# Patient Record
Sex: MALE | Race: WHITE | NOT HISPANIC OR LATINO | Employment: OTHER | URBAN - METROPOLITAN AREA
[De-identification: names, ages, dates, MRNs, and addresses within clinical notes are randomized per-mention and may not be internally consistent; named-entity substitution may affect disease eponyms.]

---

## 2018-04-05 LAB
ABSOL LYMPHOCYTES (HISTORICAL): 1.3 K/UL (ref 0.5–4)
ALBUMIN SERPL BCP-MCNC: 3.9 G/DL (ref 3–5.2)
ALP SERPL-CCNC: 87 U/L (ref 43–122)
ALT SERPL W P-5'-P-CCNC: 21 U/L (ref 9–52)
ANION GAP SERPL CALCULATED.3IONS-SCNC: 11 MMOL/L (ref 5–14)
AST SERPL W P-5'-P-CCNC: 14 U/L (ref 17–59)
BASOPHILS # BLD AUTO: 0 % (ref 0–1)
BASOPHILS # BLD AUTO: 0 K/UL (ref 0–0.1)
BILIRUB SERPL-MCNC: 0.4 MG/DL
BUN SERPL-MCNC: 24 MG/DL (ref 5–25)
CALCIUM SERPL-MCNC: 9 MG/DL (ref 8.4–10.2)
CHLORIDE SERPL-SCNC: 102 MEQ/L (ref 97–108)
CO2 SERPL-SCNC: 21 MMOL/L (ref 22–30)
COMMENT (HISTORICAL): ABNORMAL
CREATINE, SERUM (HISTORICAL): 1.39 MG/DL (ref 0.7–1.5)
DEPRECATED RDW RBC AUTO: 18.9 %
EGFR (HISTORICAL): 49 ML/MIN/1.73 M2
EOSINOPHIL # BLD AUTO: 0.3 K/UL (ref 0–0.4)
EOSINOPHIL NFR BLD AUTO: 4 % (ref 0–6)
EST. AVERAGE GLUCOSE BLD GHB EST-MCNC: 169 MG/DL
GLUCOSE SERPL-MCNC: 231 MG/DL (ref 70–99)
HBA1C MFR BLD HPLC: 7.5 %
HCT VFR BLD AUTO: 27.1 % (ref 41–53)
HGB BLD-MCNC: 8.1 G/DL (ref 13.5–17.5)
LYMPHOCYTES NFR BLD AUTO: 16 % (ref 25–45)
MCH RBC QN AUTO: 19.8 PG (ref 26–34)
MCHC RBC AUTO-ENTMCNC: 29.8 % (ref 31–36)
MCV RBC AUTO: 67 FL (ref 80–100)
MONOCYTES # BLD AUTO: 0.6 K/UL (ref 0.2–0.9)
MONOCYTES NFR BLD AUTO: 7 % (ref 1–10)
NEUTROPHILS ABS COUNT (HISTORICAL): 5.7 K/UL (ref 1.8–7.8)
NEUTS SEG NFR BLD AUTO: 73 % (ref 45–65)
PLATELET # BLD AUTO: 292 K/MCL (ref 150–450)
POTASSIUM SERPL-SCNC: 4.9 MEQ/L (ref 3.6–5)
RBC # BLD AUTO: 4.06 M/MCL (ref 4.5–5.9)
RBC MORPHOLOGY (HISTORICAL): ABNORMAL
SODIUM SERPL-SCNC: 135 MEQ/L (ref 137–147)
TOTAL PROTEIN (HISTORICAL): 7 G/DL (ref 5.9–8.4)
WBC # BLD AUTO: 7.9 K/MCL (ref 4.5–11)

## 2018-08-03 ENCOUNTER — OFFICE VISIT (OUTPATIENT)
Dept: FAMILY MEDICINE CLINIC | Facility: CLINIC | Age: 83
End: 2018-08-03
Payer: MEDICARE

## 2018-08-03 VITALS
HEART RATE: 78 BPM | SYSTOLIC BLOOD PRESSURE: 120 MMHG | RESPIRATION RATE: 18 BRPM | TEMPERATURE: 96.1 F | DIASTOLIC BLOOD PRESSURE: 60 MMHG

## 2018-08-03 DIAGNOSIS — R41.89 COGNITIVE AND BEHAVIORAL CHANGES: ICD-10-CM

## 2018-08-03 DIAGNOSIS — J30.1 ALLERGIC RHINITIS DUE TO POLLEN, UNSPECIFIED SEASONALITY: Primary | ICD-10-CM

## 2018-08-03 DIAGNOSIS — R46.89 COGNITIVE AND BEHAVIORAL CHANGES: ICD-10-CM

## 2018-08-03 DIAGNOSIS — I63.9 CEREBROVASCULAR ACCIDENT (CVA), UNSPECIFIED MECHANISM (HCC): ICD-10-CM

## 2018-08-03 DIAGNOSIS — R80.9 MICROALBUMINURIA: ICD-10-CM

## 2018-08-03 DIAGNOSIS — C61 PROSTATE CANCER (HCC): ICD-10-CM

## 2018-08-03 DIAGNOSIS — D51.8 OTHER VITAMIN B12 DEFICIENCY ANEMIAS: ICD-10-CM

## 2018-08-03 PROBLEM — D64.9 ANEMIA: Chronic | Status: ACTIVE | Noted: 2018-08-03

## 2018-08-03 PROCEDURE — 83918 ORGANIC ACIDS TOTAL QUANT: CPT | Performed by: FAMILY MEDICINE

## 2018-08-03 PROCEDURE — 99214 OFFICE O/P EST MOD 30 MIN: CPT | Performed by: FAMILY MEDICINE

## 2018-08-03 PROCEDURE — 36415 COLL VENOUS BLD VENIPUNCTURE: CPT | Performed by: FAMILY MEDICINE

## 2018-08-03 PROCEDURE — 85025 COMPLETE CBC W/AUTO DIFF WBC: CPT | Performed by: FAMILY MEDICINE

## 2018-08-03 PROCEDURE — 82607 VITAMIN B-12: CPT | Performed by: FAMILY MEDICINE

## 2018-08-03 PROCEDURE — 84165 PROTEIN E-PHORESIS SERUM: CPT | Performed by: PATHOLOGY

## 2018-08-03 PROCEDURE — 84165 PROTEIN E-PHORESIS SERUM: CPT | Performed by: FAMILY MEDICINE

## 2018-08-03 PROCEDURE — 82728 ASSAY OF FERRITIN: CPT | Performed by: FAMILY MEDICINE

## 2018-08-03 RX ORDER — DOXYCYCLINE HYCLATE 100 MG/1
CAPSULE ORAL
COMMUNITY
Start: 2018-06-12 | End: 2019-01-08

## 2018-08-03 RX ORDER — TAMSULOSIN HYDROCHLORIDE 0.4 MG/1
CAPSULE ORAL
COMMUNITY
Start: 2018-05-21 | End: 2018-10-11 | Stop reason: SDUPTHER

## 2018-08-03 RX ORDER — INSULIN GLARGINE 100 [IU]/ML
INJECTION, SOLUTION SUBCUTANEOUS
COMMUNITY
Start: 2010-12-02

## 2018-08-03 RX ORDER — PRAVASTATIN SODIUM 40 MG
TABLET ORAL EVERY 24 HOURS
COMMUNITY
Start: 2017-08-28

## 2018-08-03 RX ORDER — LEVETIRACETAM 250 MG/1
TABLET ORAL EVERY 12 HOURS
COMMUNITY
Start: 2018-04-05

## 2018-08-03 RX ORDER — BIMATOPROST 0.3 MG/ML
SOLUTION/ DROPS OPHTHALMIC EVERY 24 HOURS
COMMUNITY
Start: 2012-09-26

## 2018-08-03 RX ORDER — CILOSTAZOL 100 MG/1
TABLET ORAL EVERY 12 HOURS
COMMUNITY
Start: 2018-06-08 | End: 2019-04-18

## 2018-08-03 RX ORDER — OMEPRAZOLE 20 MG/1
CAPSULE, DELAYED RELEASE ORAL
COMMUNITY
Start: 2017-12-08 | End: 2019-04-29 | Stop reason: SDUPTHER

## 2018-08-03 RX ORDER — PROCHLORPERAZINE MALEATE 5 MG/1
TABLET ORAL EVERY 6 HOURS
COMMUNITY
Start: 2018-02-28

## 2018-08-03 NOTE — PROGRESS NOTES
Assessment/Plan:    Gastroesophageal reflux disease  No chngs     Ataxic gait  Getting worse over time: home pt eval and treat        Diagnoses and all orders for this visit:    Allergic rhinitis due to pollen, unspecified seasonality    Cerebrovascular accident (CVA), unspecified mechanism ([facility] )  -     Ferritin  -     CBC and differential  -     Methylmalonic acid, serum  -     Vitamin B12  -     Protein electrophoresis, serum  -     Ambulatory referral to Physical Therapy; Future    Prostate cancer (HCC)  -     Ferritin  -     CBC and differential  -     Methylmalonic acid, serum  -     Vitamin B12  -     Protein electrophoresis, serum    Microalbuminuria    Other orders  -     doxycycline hyclate (VIBRAMYCIN) 100 mg capsule; take 1 capsule BID  -     cilostazol (PLETAL) 100 mg tablet; Every 12 hours  -     tamsulosin (FLOMAX) 0 4 mg; TAKE 1 CAPSULE BY MOUTH EVERY DAY 1/2 HOUR FOLLOWING THE SAME MEAL EACH DAY  -     levETIRAcetam (KEPPRA) 250 mg tablet; Every 12 hours  -     prochlorperazine (COMPAZINE) 5 mg tablet; every 6 (six) hours  -     omeprazole (PriLOSEC) 20 mg delayed release capsule; take 1 capsule (20MG)  by ORAL route  every day before a meal  -     metoprolol tartrate (LOPRESSOR) 25 mg tablet; Every 12 hours  -     pravastatin (PRAVACHOL) 40 mg tablet; every 24 hours  -     Insulin Lispro (HUMALOG) 100 units/mL cartridge for injection; inject by subcutaneous route as per insulin sliding scale protocol  -     insulin glargine (LANTUS) 100 units/mL subcutaneous injection; inject by Subcutaneous route as per insulin protocol  -     bimatoprost (LUMIGAN) 0 03 % ophthalmic drops; every 24 hours  -     cyanocobalamin 100 MCG tablet; take 1 by Oral route once  -     brimonidine (ALPHAGAN P) 0 1 %; every 8 (eight) hours          Subjective:      Patient ID: Jon Harvey is a 80 y o  male  Patient comes for his regular checkup    Since last time he has had a gradually worsening energy and fatigue at home as well as gait imbalance  He had done well with physical therapy in the past and we will reorder same now  Also his chronic anemia somewhat worse lately no obvious blood loss is reported  He has not had a factor workup done in the last several years  Otherwise medications are the same and family reports no other new issues  The following portions of the patient's history were reviewed and updated as appropriate: allergies, current medications, past family history, past medical history, past social history, past surgical history and problem list     Review of Systems   Constitutional: Negative for activity change and appetite change  HENT: Negative for trouble swallowing  Eyes: Negative for visual disturbance  Respiratory: Negative for cough and shortness of breath  Cardiovascular: Negative for chest pain, palpitations and leg swelling  Gastrointestinal: Negative for abdominal pain and blood in stool  Endocrine: Negative for polyuria  Genitourinary: Negative for difficulty urinating and hematuria  Skin: Negative for rash  Neurological: Negative for dizziness  Psychiatric/Behavioral: Negative for behavioral problems  Objective:  Vitals:    08/03/18 1154   BP: 120/60   BP Location: Left arm   Patient Position: Sitting   Cuff Size: Adult   Pulse: 78   Resp: 18   Temp: (!) 96 1 °F (35 6 °C)      Physical Exam   Constitutional: He appears well-developed and well-nourished  HENT:   Head: Normocephalic and atraumatic  Eyes: Conjunctivae are normal    Neck: Neck supple  No thyromegaly present  Cardiovascular: Normal rate, regular rhythm, normal heart sounds and intact distal pulses  No murmur heard  Pulmonary/Chest: Effort normal and breath sounds normal  No respiratory distress  Musculoskeletal: He exhibits no edema  Lymphadenopathy:     He has no cervical adenopathy  Skin: Skin is warm and dry     Psychiatric:   Memory / Cognitive decline gradually

## 2018-08-03 NOTE — PATIENT INSTRUCTIONS
IF YOU HAVE NEVER HAD A TDAP SHOT (TETANUS/DIPHTHERIA/PERTUSSIS)  TALK TO YOUR PHARMACIST ABOUT GETTING ONE : GOOD FOR 10 YRS:ESPECIALLY IMPORTATN IF YOU HAVE YOUNG CHILDREN OR GRANDCHILDREN    GET A YEARLY FLU SHOT IN THE FALL : IF OVER 65 GET "HIGH DOSE"     ASK PHARMACIST ABOUT "SHINGRIX" THE NEW SHINGLES VACCINE IF OVER AGE 50      IF YOU HAVE NEVER HAD A TDAP SHOT (TETANUS/DIPHTHERIA/PERTUSSIS)  TALK TO YOUR PHARMACIST ABOUT GETTING ONE : GOOD FOR 10 YRS:ESPECIALLY IMPORTATN IF YOU HAVE YOUNG CHILDREN OR GRANDCHILDREN    GET A YEARLY FLU SHOT IN THE FALL : IF OVER 65 GET "HIGH DOSE"     ASK PHARMACIST ABOUT "SHINGRIX" THE NEW SHINGLES VACCINE IF OVER AGE 50

## 2018-08-04 LAB
BASOPHILS # BLD AUTO: 0.02 THOUSANDS/ΜL (ref 0–0.1)
BASOPHILS NFR BLD AUTO: 0 % (ref 0–1)
EOSINOPHIL # BLD AUTO: 0.2 THOUSAND/ΜL (ref 0–0.61)
EOSINOPHIL NFR BLD AUTO: 2 % (ref 0–6)
ERYTHROCYTE [DISTWIDTH] IN BLOOD BY AUTOMATED COUNT: 19.4 % (ref 11.6–15.1)
FERRITIN SERPL-MCNC: 9 NG/ML (ref 8–388)
HCT VFR BLD AUTO: 31.9 % (ref 36.5–49.3)
HGB BLD-MCNC: 8.5 G/DL (ref 12–17)
IMM GRANULOCYTES # BLD AUTO: 0.01 THOUSAND/UL (ref 0–0.2)
IMM GRANULOCYTES NFR BLD AUTO: 0 % (ref 0–2)
LYMPHOCYTES # BLD AUTO: 1.87 THOUSANDS/ΜL (ref 0.6–4.47)
LYMPHOCYTES NFR BLD AUTO: 22 % (ref 14–44)
MCH RBC QN AUTO: 20.2 PG (ref 26.8–34.3)
MCHC RBC AUTO-ENTMCNC: 26.6 G/DL (ref 31.4–37.4)
MCV RBC AUTO: 76 FL (ref 82–98)
MONOCYTES # BLD AUTO: 0.59 THOUSAND/ΜL (ref 0.17–1.22)
MONOCYTES NFR BLD AUTO: 7 % (ref 4–12)
NEUTROPHILS # BLD AUTO: 5.93 THOUSANDS/ΜL (ref 1.85–7.62)
NEUTS SEG NFR BLD AUTO: 69 % (ref 43–75)
NRBC BLD AUTO-RTO: 0 /100 WBCS
PLATELET # BLD AUTO: 394 THOUSANDS/UL (ref 149–390)
PMV BLD AUTO: 9.6 FL (ref 8.9–12.7)
RBC # BLD AUTO: 4.2 MILLION/UL (ref 3.88–5.62)
VIT B12 SERPL-MCNC: 299 PG/ML (ref 100–900)
WBC # BLD AUTO: 8.62 THOUSAND/UL (ref 4.31–10.16)

## 2018-08-06 ENCOUNTER — TELEPHONE (OUTPATIENT)
Dept: FAMILY MEDICINE CLINIC | Facility: CLINIC | Age: 83
End: 2018-08-06

## 2018-08-06 LAB
ALBUMIN SERPL ELPH-MCNC: 3.63 G/DL (ref 3.5–5)
ALBUMIN SERPL ELPH-MCNC: 51.8 % (ref 52–65)
ALPHA1 GLOB SERPL ELPH-MCNC: 0.36 G/DL (ref 0.1–0.4)
ALPHA1 GLOB SERPL ELPH-MCNC: 5.1 % (ref 2.5–5)
ALPHA2 GLOB SERPL ELPH-MCNC: 0.79 G/DL (ref 0.4–1.2)
ALPHA2 GLOB SERPL ELPH-MCNC: 11.3 % (ref 7–13)
BETA GLOB ABNORMAL SERPL ELPH-MCNC: 0.53 G/DL (ref 0.4–0.8)
BETA1 GLOB SERPL ELPH-MCNC: 7.5 % (ref 5–13)
BETA2 GLOB SERPL ELPH-MCNC: 5.5 % (ref 2–8)
BETA2+GAMMA GLOB SERPL ELPH-MCNC: 0.39 G/DL (ref 0.2–0.5)
GAMMA GLOB ABNORMAL SERPL ELPH-MCNC: 1.32 G/DL (ref 0.5–1.6)
GAMMA GLOB SERPL ELPH-MCNC: 18.8 % (ref 12–22)
IGG/ALB SER: 1.07 {RATIO} (ref 1.1–1.8)
PROT PATTERN SERPL ELPH-IMP: ABNORMAL
PROT SERPL-MCNC: 7 G/DL (ref 6.4–8.2)

## 2018-08-06 NOTE — TELEPHONE ENCOUNTER
----- Message from Abhishek Hamilton MD sent at 8/4/2018 10:57 AM EDT -----  I need a working number ; the ones on chart not so

## 2018-08-07 LAB
METHYLMALONATE SERPL-SCNC: 321 NMOL/L (ref 0–378)
SL AMB DISCLAIMER: NORMAL

## 2018-08-09 DIAGNOSIS — I63.9 CEREBROVASCULAR ACCIDENT (CVA), UNSPECIFIED MECHANISM (HCC): Primary | ICD-10-CM

## 2018-08-09 RX ORDER — CLOPIDOGREL BISULFATE 75 MG/1
TABLET ORAL
Qty: 90 TABLET | Refills: 3 | Status: SHIPPED | OUTPATIENT
Start: 2018-08-09

## 2018-10-11 DIAGNOSIS — N40.0 BENIGN PROSTATIC HYPERPLASIA, UNSPECIFIED WHETHER LOWER URINARY TRACT SYMPTOMS PRESENT: Primary | ICD-10-CM

## 2018-10-11 RX ORDER — TAMSULOSIN HYDROCHLORIDE 0.4 MG/1
0.4 CAPSULE ORAL
Qty: 90 CAPSULE | Refills: 1 | Status: SHIPPED | OUTPATIENT
Start: 2018-10-11

## 2018-10-16 ENCOUNTER — TELEPHONE (OUTPATIENT)
Dept: FAMILY MEDICINE CLINIC | Facility: CLINIC | Age: 83
End: 2018-10-16

## 2018-10-16 ENCOUNTER — TREATMENT (OUTPATIENT)
Dept: FAMILY MEDICINE CLINIC | Facility: CLINIC | Age: 83
End: 2018-10-16

## 2018-10-16 DIAGNOSIS — R26.2 AMBULATORY DYSFUNCTION: Primary | ICD-10-CM

## 2018-10-16 NOTE — TELEPHONE ENCOUNTER
Call from pt wife requesting that spouse be set up to have lab draws done at their home  Иван Palencia is requesting that [address] generate a referral so that Elaina Martinez can have a evaluation done regarding his ADL's  [facility] states that he has had a severe decline especially in the morning and the evening time that requires a lot of help that she is unable to give him  Pt was receiving services in the past thru [facility] that was only in the afternoon  Please advise thank you

## 2018-10-26 ENCOUNTER — TELEPHONE (OUTPATIENT)
Dept: FAMILY MEDICINE CLINIC | Facility: CLINIC | Age: 83
End: 2018-10-26

## 2018-10-26 NOTE — TELEPHONE ENCOUNTER
Patient wife called she said to cancel his appointment for today at 430 pm I asked if she would like to reschedule the appointment she said she will call at another time to make another appointment so it was cancelled per her request

## 2019-01-02 ENCOUNTER — TELEPHONE (OUTPATIENT)
Dept: FAMILY MEDICINE CLINIC | Facility: CLINIC | Age: 84
End: 2019-01-02

## 2019-01-02 NOTE — TELEPHONE ENCOUNTER
Patient wife called she said to cancel his appointment on 1/4/19 at 56 am with dr Pallavi Watson and reschedule to another day so the appointment was cancelled and rescheduled to 1/8/19 at 3 pm with dr Pallavi Watson per wife to change the appointment date and time

## 2019-01-08 ENCOUNTER — OFFICE VISIT (OUTPATIENT)
Dept: FAMILY MEDICINE CLINIC | Facility: CLINIC | Age: 84
End: 2019-01-08
Payer: MEDICARE

## 2019-01-08 VITALS
SYSTOLIC BLOOD PRESSURE: 138 MMHG | OXYGEN SATURATION: 99 % | TEMPERATURE: 97.4 F | RESPIRATION RATE: 18 BRPM | HEART RATE: 83 BPM | DIASTOLIC BLOOD PRESSURE: 62 MMHG

## 2019-01-08 DIAGNOSIS — K21.9 GASTROESOPHAGEAL REFLUX DISEASE WITHOUT ESOPHAGITIS: ICD-10-CM

## 2019-01-08 DIAGNOSIS — I73.9 PERIPHERAL VASCULAR DISEASE (HCC): ICD-10-CM

## 2019-01-08 DIAGNOSIS — R41.89 COGNITIVE IMPAIRMENT: Primary | Chronic | ICD-10-CM

## 2019-01-08 PROCEDURE — G0008 ADMIN INFLUENZA VIRUS VAC: HCPCS | Performed by: FAMILY MEDICINE

## 2019-01-08 PROCEDURE — 99495 TRANSJ CARE MGMT MOD F2F 14D: CPT | Performed by: FAMILY MEDICINE

## 2019-01-08 PROCEDURE — 90662 IIV NO PRSV INCREASED AG IM: CPT | Performed by: FAMILY MEDICINE

## 2019-01-08 RX ORDER — ERGOCALCIFEROL (VITAMIN D2) 1250 MCG
50000 CAPSULE ORAL
COMMUNITY
Start: 2017-06-20 | End: 2019-01-08

## 2019-01-08 RX ORDER — ACETAMINOPHEN 500 MG
500 TABLET ORAL EVERY 6 HOURS PRN
COMMUNITY
Start: 2018-12-20

## 2019-01-08 RX ORDER — TRAMADOL HYDROCHLORIDE 50 MG/1
50 TABLET ORAL EVERY 6 HOURS PRN
Qty: 30 TABLET | Refills: 0 | Status: SHIPPED | OUTPATIENT
Start: 2019-01-08 | End: 2019-01-18

## 2019-01-08 RX ORDER — OXYCODONE HYDROCHLORIDE AND ACETAMINOPHEN 5; 325 MG/1; MG/1
1 TABLET ORAL EVERY 6 HOURS PRN
COMMUNITY
Start: 2018-12-20 | End: 2019-01-08

## 2019-01-08 RX ORDER — FLASH GLUCOSE SENSOR
1 KIT MISCELLANEOUS
COMMUNITY
Start: 2018-08-08

## 2019-01-08 NOTE — PROGRESS NOTES
Assessment/Plan:    No problem-specific Assessment & Plan notes found for this encounter  Diagnoses and all orders for this visit:    Cognitive impairment    Peripheral vascular disease ([facility] )  -     influenza vaccine, 6833-6994, high-dose, PF 0 5 mL, for patients 65 yr+ (FLUZONE HIGH-DOSE)  -     traMADol (ULTRAM) 50 mg tablet; Take 1 tablet (50 mg total) by mouth every 6 (six) hours as needed for moderate pain    Gastroesophageal reflux disease without esophagitis    Other orders  -     Discontinue: oxyCODONE-acetaminophen (PERCOCET) 5-325 mg per tablet; Take 1 tablet by mouth every 6 (six) hours as needed  -     glucagon (GLUCAGON EMERGENCY) 1 MG injection; 1 mg  -     Insulin Pen Needle 31G X 8 MM MISC; use 4  times daily  Dx code: E10 9   -     Continuous Blood Gluc Sensor ([address] [place] [address]) MISC; 1 kit by Does not apply route  -     Discontinue: ergocalciferol (ERGOCALCIFEROL) 71194 units capsule; Take 50,000 Units by mouth  -     cyanocobalamin 100 MCG tablet; Take 100 mcg by mouth  -     glucose blood test strip; Tests 6 times daily  Dx code: E10 9  -     acetaminophen (TYLENOL) 500 mg tablet; Take 500 mg by mouth every 6 (six) hours as needed          Subjective:      Patient ID: Edgar Cervantes is a 80 y o  male  Patient returns for a post hospitalization/rehab visit less than 14      Venessa was at his foot doctor with some complaints of pain and redness of the foot it was initially thought to be infection antibiotics did not clear it  They were then admitted to the hospital where a various studies did not prove any definite bone infection and in fact even soft tissue infection was questionable  It seems that after the testing was completed circulatory issues were the main problem  At this time there is no open area the redness is much improved after therapy for a week or 2  He still has significant discomfort in the small toe if he has any contact or banging of that toe  The following portions of the patient's history were reviewed and updated as appropriate: allergies, current medications, past family history, past medical history, past social history, past surgical history and problem list     Review of Systems   Constitutional: Negative for activity change and appetite change  HENT: Negative for trouble swallowing  Eyes: Negative for visual disturbance  Respiratory: Negative for cough and shortness of breath  Cardiovascular: Negative for chest pain, palpitations and leg swelling  Gastrointestinal: Negative for abdominal pain and blood in stool  Endocrine: Negative for polyuria  Genitourinary: Negative for difficulty urinating and hematuria  Nocturnal incontinence    Skin: Negative for rash  Neurological: Negative for dizziness  Psychiatric/Behavioral: Negative for behavioral problems  Objective:  Vitals:    01/08/19 1508   BP: 138/62   BP Location: Left arm   Patient Position: Sitting   Cuff Size: Standard   Pulse: 83   Resp: 18   Temp: (!) 97 4 °F (36 3 °C)   SpO2: 99%      Physical Exam   Constitutional: He appears well-developed and well-nourished  HENT:   Head: Normocephalic and atraumatic  Eyes: Conjunctivae are normal    Neck: Neck supple  No thyromegaly present  Cardiovascular: Normal rate, regular rhythm, normal heart sounds and intact distal pulses  No murmur heard  Pulmonary/Chest: Effort normal and breath sounds normal  No respiratory distress  Musculoskeletal: He exhibits no edema  Lymphadenopathy:     He has no cervical adenopathy  Skin: Skin is warm and dry  Psychiatric: He has a normal mood and affect   His behavior is normal

## 2019-01-18 ENCOUNTER — TELEPHONE (OUTPATIENT)
Dept: FAMILY MEDICINE CLINIC | Facility: CLINIC | Age: 84
End: 2019-01-18

## 2019-01-18 ENCOUNTER — TREATMENT (OUTPATIENT)
Dept: FAMILY MEDICINE CLINIC | Facility: CLINIC | Age: 84
End: 2019-01-18

## 2019-01-18 DIAGNOSIS — R52 PAIN: Primary | ICD-10-CM

## 2019-01-18 RX ORDER — ACETAMINOPHEN AND CODEINE PHOSPHATE 300; 30 MG/1; MG/1
TABLET ORAL
Qty: 30 TABLET | Refills: 3 | Status: SHIPPED | OUTPATIENT
Start: 2019-01-18

## 2019-01-18 NOTE — TELEPHONE ENCOUNTER
[facility], [address] nurse called stating the patient had a TIA episode today  She put pt wife on phone and explained what happened  She stated the episode happened at 2:30pm today and she looked at him in the recliner and get his attention  She states he did not respond so she put the chair back and started rubbing his chest  She then noticed his tongue was out partially and he was drooling  She stated his face was ice cold but he was sweating  She kept trying to get him to wake up and kept rubbing his chest  This all lasted about 7-8 minutes  She states he then started to come around and open his eyes  She asked if he had pain anywhere and he did not verbalize anything  She asked him to stick out his tongue to check it  She said he then seemed back to himself  She stated he is very lethargic  She states the Tramadol seems to knock him for a loop for the whole day  She states it is difficult to wake him up  The next time she only gave him a 1/2 tablet and tried a 1/2 of the 1/2 and that seemed to be better  She said he had a lot of pain today and right now he is awake but lethargic  She is wondering if something else can be prescribed in place of the tramadol

## 2019-01-22 ENCOUNTER — TELEPHONE (OUTPATIENT)
Dept: FAMILY MEDICINE CLINIC | Facility: CLINIC | Age: 84
End: 2019-01-22

## 2019-02-04 ENCOUNTER — TELEPHONE (OUTPATIENT)
Dept: FAMILY MEDICINE CLINIC | Facility: CLINIC | Age: 84
End: 2019-02-04

## 2019-02-04 NOTE — TELEPHONE ENCOUNTER
maria del rosario from [facility] called she is the nursing supervisor she needs to know if he had the flu shot and the Pheum shot please call maria del rosario at [address]

## 2019-02-15 ENCOUNTER — TELEPHONE (OUTPATIENT)
Dept: FAMILY MEDICINE CLINIC | Facility: CLINIC | Age: 84
End: 2019-02-15

## 2019-02-18 ENCOUNTER — TRANSITIONAL CARE MANAGEMENT (OUTPATIENT)
Dept: FAMILY MEDICINE CLINIC | Facility: CLINIC | Age: 84
End: 2019-02-18

## 2019-02-20 NOTE — TELEPHONE ENCOUNTER
Went to follow up with wife regarding tcm  Seen in chart review pt is readmitted for sepsis  When discharge again will do tcm

## 2019-03-26 ENCOUNTER — TREATMENT (OUTPATIENT)
Dept: FAMILY MEDICINE CLINIC | Facility: CLINIC | Age: 84
End: 2019-03-26

## 2019-03-26 ENCOUNTER — TELEPHONE (OUTPATIENT)
Dept: FAMILY MEDICINE CLINIC | Facility: CLINIC | Age: 84
End: 2019-03-26

## 2019-03-26 DIAGNOSIS — L89.309 PRESSURE INJURY OF SKIN OF BUTTOCK, UNSPECIFIED INJURY STAGE, UNSPECIFIED LATERALITY: Primary | ICD-10-CM

## 2019-03-26 NOTE — TELEPHONE ENCOUNTER
Pt wife called stating she would like to know if she can get a script for an extra wide commode for the patient

## 2019-03-26 NOTE — TELEPHONE ENCOUNTER
Pt daughter stopped by the office stating she needs a form filled out for diabetic shoes  She will have the form faxed over  She would also like a handicapped placard form filled out due to his right leg amputation and a referral for wound care  She states the wound is on his buttocks and started about a week after his surgery  Surgery date was 02/22/19  He got this when he was in [facility]  He went into [facility] with the wound  She states it is not open but looks like a brush burn  She would just like for it to be looked at  He does have home care nurses coming out and doing OT and PT 2 times a week  She would like a call when the forms and referral are done and ready for  at [phone]  If no answer may leave message  Form for placard with be given to [place]

## 2019-03-27 DIAGNOSIS — R41.89 COGNITIVE IMPAIRMENT: Primary | Chronic | ICD-10-CM

## 2019-03-27 NOTE — TELEPHONE ENCOUNTER
Wife called back states that pt no longer needs dm forms filled out because he has to go for an evaluation in may  Advised wife that all other forms are ready for    Placed in drawer

## 2019-03-27 NOTE — TELEPHONE ENCOUNTER
Spoke to pt wife advised her everything is completed except form for diabetic shoes  Still have not received form  Pt wife states she will find out status of form and will call back

## 2019-04-08 ENCOUNTER — TELEPHONE (OUTPATIENT)
Dept: FAMILY MEDICINE CLINIC | Facility: CLINIC | Age: 84
End: 2019-04-08

## 2019-04-15 ENCOUNTER — TREATMENT (OUTPATIENT)
Dept: FAMILY MEDICINE CLINIC | Facility: CLINIC | Age: 84
End: 2019-04-15

## 2019-04-15 ENCOUNTER — TELEPHONE (OUTPATIENT)
Dept: FAMILY MEDICINE CLINIC | Facility: CLINIC | Age: 84
End: 2019-04-15

## 2019-04-15 DIAGNOSIS — N39.0 URINARY TRACT INFECTION WITHOUT HEMATURIA, SITE UNSPECIFIED: Primary | ICD-10-CM

## 2019-04-15 RX ORDER — CIPROFLOXACIN 250 MG/1
250 TABLET, FILM COATED ORAL EVERY 12 HOURS SCHEDULED
Qty: 14 TABLET | Refills: 0 | Status: SHIPPED | OUTPATIENT
Start: 2019-04-15 | End: 2019-04-22

## 2019-04-15 RX ORDER — SULFAMETHOXAZOLE AND TRIMETHOPRIM 800; 160 MG/1; MG/1
1 TABLET ORAL EVERY 12 HOURS SCHEDULED
Qty: 14 TABLET | Refills: 0 | Status: SHIPPED | OUTPATIENT
Start: 2019-04-15 | End: 2019-04-15 | Stop reason: CLARIF

## 2019-04-18 ENCOUNTER — TELEPHONE (OUTPATIENT)
Dept: FAMILY MEDICINE CLINIC | Facility: CLINIC | Age: 84
End: 2019-04-18

## 2019-04-18 ENCOUNTER — TREATMENT (OUTPATIENT)
Dept: FAMILY MEDICINE CLINIC | Facility: CLINIC | Age: 84
End: 2019-04-18

## 2019-04-29 DIAGNOSIS — K21.9 GASTROESOPHAGEAL REFLUX DISEASE WITHOUT ESOPHAGITIS: Primary | ICD-10-CM

## 2019-04-29 RX ORDER — OMEPRAZOLE 20 MG/1
CAPSULE, DELAYED RELEASE ORAL
Qty: 90 CAPSULE | Refills: 2 | Status: SHIPPED | OUTPATIENT
Start: 2019-04-29

## 2019-05-13 ENCOUNTER — TELEPHONE (OUTPATIENT)
Dept: FAMILY MEDICINE CLINIC | Facility: CLINIC | Age: 84
End: 2019-05-13

## 2019-07-01 ENCOUNTER — TELEPHONE (OUTPATIENT)
Dept: FAMILY MEDICINE CLINIC | Facility: CLINIC | Age: 84
End: 2019-07-01

## 2019-07-01 NOTE — TELEPHONE ENCOUNTER
Call from pt spouse Dontrell Singh want to know if our office received the medical supply form/request for patient diabetic supplies from the company [employer]? Please advise Dontrell Singh can be reached at the number on file thank you

## 2019-07-11 ENCOUNTER — TREATMENT (OUTPATIENT)
Dept: FAMILY MEDICINE CLINIC | Facility: CLINIC | Age: 84
End: 2019-07-11

## 2019-07-11 ENCOUNTER — TELEPHONE (OUTPATIENT)
Dept: FAMILY MEDICINE CLINIC | Facility: CLINIC | Age: 84
End: 2019-07-11

## 2019-07-11 DIAGNOSIS — N39.0 URINARY TRACT INFECTION WITHOUT HEMATURIA, SITE UNSPECIFIED: Primary | ICD-10-CM

## 2019-07-11 RX ORDER — SULFAMETHOXAZOLE AND TRIMETHOPRIM 800; 160 MG/1; MG/1
1 TABLET ORAL EVERY 12 HOURS SCHEDULED
Qty: 14 TABLET | Refills: 0 | Status: SHIPPED | OUTPATIENT
Start: 2019-07-11 | End: 2019-07-18

## 2019-07-11 NOTE — TELEPHONE ENCOUNTER
Yaakov Matamoros patient's daughter called stating she is waiting for urologic and wound supplies order to be signed  They are in Dr Solitario lara for review and signature  She also states that he may have a UTI  He is confused and his urine is cloudy and a strong smell  She is not able to bring him in due to being in a whee chair  She would like to know if she can get a script faxed to [facility] for a urine  Fax# [phone]

## 2019-07-11 NOTE — TELEPHONE ENCOUNTER
Family called and we discussed the likelihood that his mental status change has nothing to do with his urine at any rate they did want him of treated for urinary infection this seems to be is fairly simple thing to do family is still of the mind set that we are not going to be sending him to the hospital except for symptom control so we will try the urinary antibiotic for a week and see how he does

## 2019-09-06 ENCOUNTER — TELEPHONE (OUTPATIENT)
Dept: FAMILY MEDICINE CLINIC | Facility: CLINIC | Age: 84
End: 2019-09-06

## 2019-09-06 NOTE — TELEPHONE ENCOUNTER
Marietta called from [facility] stating pt is getting discharged from [facility] tomorrow to home  They want to know if pt needs to be seen since [facility] wants Lyndsay Singh to see him on Sunday  However they need a his pcp to sign off on the plan of care in order to set up visits  Her contact number is [phone]

## 2019-09-09 NOTE — TELEPHONE ENCOUNTER
Spoke to patients daughter about having an appointment  Daughter states that patient is home bound and may possibly need to be in a facility  Patient may not be able to come in for a visit

## 2019-09-09 NOTE — TELEPHONE ENCOUNTER
Left message for travon to call the office back  Call was to advise travon that patient needs to set up an appointment at [facility] in order to continue having place of care orders signed  Patient has not been seen in office for some time

## 2019-09-13 ENCOUNTER — TELEPHONE (OUTPATIENT)
Dept: FAMILY MEDICINE CLINIC | Facility: CLINIC | Age: 84
End: 2019-09-13

## 2019-09-19 ENCOUNTER — TELEPHONE (OUTPATIENT)
Dept: FAMILY MEDICINE CLINIC | Facility: CLINIC | Age: 84
End: 2019-09-19

## 2019-09-19 NOTE — TELEPHONE ENCOUNTER
Please call daughter back regarding father urine results  Daughter Nerissa Mack states that you were contacted by nurse today requesting a order  Please advise at [phone] thank you

## 2019-09-20 ENCOUNTER — TREATMENT (OUTPATIENT)
Dept: FAMILY MEDICINE CLINIC | Facility: CLINIC | Age: 84
End: 2019-09-20

## 2019-09-20 DIAGNOSIS — N39.0 URINARY TRACT INFECTION WITHOUT HEMATURIA, SITE UNSPECIFIED: Primary | ICD-10-CM

## 2019-09-20 RX ORDER — SULFAMETHOXAZOLE AND TRIMETHOPRIM 800; 160 MG/1; MG/1
1 TABLET ORAL EVERY 12 HOURS SCHEDULED
Qty: 14 TABLET | Refills: 0 | Status: SHIPPED | OUTPATIENT
Start: 2019-09-20 | End: 2019-09-27

## 2019-09-20 NOTE — TELEPHONE ENCOUNTER
christina patients daughter states patient has not been himself  Patient is sluggish urine is cloudy and has a foul smell

## 2019-09-30 ENCOUNTER — TELEPHONE (OUTPATIENT)
Dept: FAMILY MEDICINE CLINIC | Facility: CLINIC | Age: 84
End: 2019-09-30

## 2019-09-30 NOTE — TELEPHONE ENCOUNTER
[facility] VISITING NURSE CALLED REQUESTING IF BRAMBILA CAN BE CHANGED TOMORROW? STATES THAT PATIENT STILL HAS INFECTION  ANTIBIOTICS HAVE NOT BEEN FINISHED   Ruthann Alonso [phone]

## 2019-10-02 ENCOUNTER — TELEPHONE (OUTPATIENT)
Dept: FAMILY MEDICINE CLINIC | Facility: CLINIC | Age: 84
End: 2019-10-02

## 2019-10-02 NOTE — TELEPHONE ENCOUNTER
Nurse called she states they need a script for power air loss mattress  Due to secondary two re openning of right stage 3 gluteal pressure ulcer  Please fax it to [facility] [phone]  Put ir for 99 months of use

## 2019-10-16 ENCOUNTER — TELEPHONE (OUTPATIENT)
Dept: FAMILY MEDICINE CLINIC | Facility: CLINIC | Age: 84
End: 2019-10-16

## 2019-10-16 NOTE — TELEPHONE ENCOUNTER
Requesting a matttress order be faxed to [facility] and [facility] visiting nurse be ordered for home visits for flu injections etc

## 2019-10-17 ENCOUNTER — TREATMENT (OUTPATIENT)
Dept: FAMILY MEDICINE CLINIC | Facility: CLINIC | Age: 84
End: 2019-10-17

## 2019-10-21 ENCOUNTER — TELEPHONE (OUTPATIENT)
Dept: FAMILY MEDICINE CLINIC | Facility: CLINIC | Age: 84
End: 2019-10-21

## 2019-10-29 ENCOUNTER — TELEPHONE (OUTPATIENT)
Dept: FAMILY MEDICINE CLINIC | Facility: CLINIC | Age: 84
End: 2019-10-29

## 2019-10-29 NOTE — TELEPHONE ENCOUNTER
Baby Phillips from [facility]   Believes Mr Anthony Acevedo is allergic to the dressings and adhesive for wound care treatment   Can they use tri-ad paste? And can they have an order faxed to [phone] for his flu injection?    Rafael Osullivan [phone]

## 2019-11-01 ENCOUNTER — TELEPHONE (OUTPATIENT)
Dept: FAMILY MEDICINE CLINIC | Facility: CLINIC | Age: 84
End: 2019-11-01

## 2019-11-01 NOTE — TELEPHONE ENCOUNTER
[facility]   The mattress will not be covered by medicare unless there are current ulcers stage2-4 present   Mattress denied

## 2019-11-19 ENCOUNTER — TELEPHONE (OUTPATIENT)
Dept: FAMILY MEDICINE CLINIC | Facility: CLINIC | Age: 84
End: 2019-11-19

## 2019-11-19 NOTE — TELEPHONE ENCOUNTER
Call family :     Give 2 tbsp milk of magnesia now repeat in 4 hours if not successful if no success by the a m  Use do collect suppositories 1 in the morning repeat in 4 hours as needed  If no luck that day then give 1/2 bottle of magnesium citrate    All of these are available over-the-counter

## 2019-11-19 NOTE — TELEPHONE ENCOUNTER
Pt has not had a bowel movement in 4 days  Pt has been using senokot 8 6/50 MG and prune juice  Abdominal area is soft, not tender  Please let family know if anything new is prescribed  Please advise  Thank you!

## 2019-12-03 ENCOUNTER — TELEPHONE (OUTPATIENT)
Dept: FAMILY MEDICINE CLINIC | Facility: CLINIC | Age: 84
End: 2019-12-03

## 2019-12-03 NOTE — TELEPHONE ENCOUNTER
Patient is discharge from [facility]  He is scheduled for a follow up on 12/17 @ 3pm but he needs a rx for CBC to check his hemoglobin please send rx to [facility] than kyou

## 2019-12-04 ENCOUNTER — TELEPHONE (OUTPATIENT)
Dept: FAMILY MEDICINE CLINIC | Facility: CLINIC | Age: 84
End: 2019-12-04

## 2019-12-04 DIAGNOSIS — D64.9 ANEMIA, UNSPECIFIED TYPE: Primary | Chronic | ICD-10-CM

## 2019-12-04 NOTE — TELEPHONE ENCOUNTER
Prisca Murphy from [employer] called: patient was d/c from hospital for GI bleed - patient had bilateral gluteal pressure ulcer stage  3  Requesting for powered low air pressure mattress to be sent to [facility]: anemia incontinence, immobility and stage 3 pressure ulcer as diagnosis   Any question please call Reji Otto at

## 2019-12-05 ENCOUNTER — TREATMENT (OUTPATIENT)
Dept: FAMILY MEDICINE CLINIC | Facility: CLINIC | Age: 84
End: 2019-12-05

## 2019-12-05 DIAGNOSIS — L89.90 PRESSURE INJURY OF SKIN, UNSPECIFIED INJURY STAGE, UNSPECIFIED LOCATION: Primary | ICD-10-CM

## 2019-12-05 NOTE — TELEPHONE ENCOUNTER
I did the order but I am not sure if it is going to print or go to the [facility] pharmacy as there was no option to printed  Expedite getting this done however you can do it

## 2019-12-06 ENCOUNTER — TELEPHONE (OUTPATIENT)
Dept: FAMILY MEDICINE CLINIC | Facility: CLINIC | Age: 84
End: 2019-12-06

## 2019-12-06 ENCOUNTER — TREATMENT (OUTPATIENT)
Dept: FAMILY MEDICINE CLINIC | Facility: CLINIC | Age: 84
End: 2019-12-06

## 2019-12-06 DIAGNOSIS — K21.9 GASTROESOPHAGEAL REFLUX DISEASE WITHOUT ESOPHAGITIS: Primary | ICD-10-CM

## 2019-12-06 RX ORDER — SUCRALFATE 1 G/1
1 TABLET ORAL 4 TIMES DAILY
Qty: 120 TABLET | Refills: 12 | Status: SHIPPED | OUTPATIENT
Start: 2019-12-06

## 2019-12-06 NOTE — TELEPHONE ENCOUNTER
Rx was sent to the Pharmacy from ER for Carafate suspension this is not covered on formulary, but Carafate tablets are  Pharmacist called to verify it would be OK to provide patient with the Carafate 1 G Tablet that is dissolvable  The patient has no issues with swallowing  RX Carafate 1 G   One tablet daily before each meal and at bed time    Dispense # 40 no refills  Can we send over an updated RX with the Tablets please

## 2019-12-11 ENCOUNTER — TREATMENT (OUTPATIENT)
Dept: FAMILY MEDICINE CLINIC | Facility: CLINIC | Age: 84
End: 2019-12-11

## 2019-12-11 ENCOUNTER — TELEPHONE (OUTPATIENT)
Dept: FAMILY MEDICINE CLINIC | Facility: CLINIC | Age: 84
End: 2019-12-11

## 2019-12-11 DIAGNOSIS — K59.00 CONSTIPATION, UNSPECIFIED CONSTIPATION TYPE: Primary | ICD-10-CM

## 2019-12-11 RX ORDER — BISACODYL 10 MG
SUPPOSITORY, RECTAL RECTAL
Qty: 6 SUPPOSITORY | Refills: 2 | Status: SHIPPED | OUTPATIENT
Start: 2019-12-11

## 2019-12-11 NOTE — TELEPHONE ENCOUNTER
The patient is constipated has used MOM, Miralax and prune juice and has not had a bowel movement       Would you please call something into the [facility] in [address] [facility]

## 2019-12-11 NOTE — TELEPHONE ENCOUNTER
Will call in suppository ; use one now and again in am if no success; If no success after this  bottle of magnesium citrate and take 1/2   Should empty bowel in 2-3 hrs after        I recommend using the miralax twice every day to avoid future issues

## 2019-12-12 ENCOUNTER — TELEPHONE (OUTPATIENT)
Dept: FAMILY MEDICINE CLINIC | Facility: CLINIC | Age: 84
End: 2019-12-12

## 2019-12-12 NOTE — TELEPHONE ENCOUNTER
[address] home care nurse called to report the patient is nauseated, still had not moved bowels  The wife was unavailable to confirm if the suppository was administered  As she was reporting out the patient began to vomit, nurse reports the vomit is coffee like and dark in color  The patient will be taken to the nearest ER

## 2019-12-23 ENCOUNTER — TELEPHONE (OUTPATIENT)
Dept: FAMILY MEDICINE CLINIC | Facility: CLINIC | Age: 84
End: 2019-12-23

## 2019-12-23 NOTE — TELEPHONE ENCOUNTER
Home care is calling they need for mobile labs for his blood work because patient is a hard stick also [facility] is asking for document for patient to get a  Medical mattress   Thank you

## 2019-12-26 DIAGNOSIS — D64.9 ANEMIA, UNSPECIFIED TYPE: Primary | Chronic | ICD-10-CM

## 2019-12-26 DIAGNOSIS — I63.9 CEREBROVASCULAR ACCIDENT (CVA), UNSPECIFIED MECHANISM (HCC): Primary | ICD-10-CM

## 2019-12-26 DIAGNOSIS — R41.89 COGNITIVE IMPAIRMENT: Chronic | ICD-10-CM

## 2019-12-30 ENCOUNTER — TELEPHONE (OUTPATIENT)
Dept: OTHER | Facility: OTHER | Age: 84
End: 2019-12-30

## 2019-12-30 NOTE — TELEPHONE ENCOUNTER
[place] is requesting a call regarding the paperwork from [facility] for the mattress for the bed

## 2020-01-03 ENCOUNTER — TELEPHONE (OUTPATIENT)
Dept: FAMILY MEDICINE CLINIC | Facility: CLINIC | Age: 85
End: 2020-01-03

## 2020-01-04 ENCOUNTER — TELEPHONE (OUTPATIENT)
Dept: OTHER | Facility: OTHER | Age: 85
End: 2020-01-04

## 2020-01-04 NOTE — TELEPHONE ENCOUNTER
No one was on call, so I called Dr Carl Poe @ [phone]  Pt  Needs hospice orders via phone  Called Pamela @ [phone] and am waiting on a call back
